# Patient Record
Sex: MALE | URBAN - METROPOLITAN AREA
[De-identification: names, ages, dates, MRNs, and addresses within clinical notes are randomized per-mention and may not be internally consistent; named-entity substitution may affect disease eponyms.]

---

## 2018-09-05 ENCOUNTER — OFFICE VISIT (OUTPATIENT)
Dept: GASTROENTEROLOGY | Facility: CLINIC | Age: 58
End: 2018-09-05
Payer: COMMERCIAL

## 2018-09-05 VITALS
WEIGHT: 215 LBS | BODY MASS INDEX: 24.88 KG/M2 | SYSTOLIC BLOOD PRESSURE: 128 MMHG | HEART RATE: 87 BPM | HEIGHT: 78 IN | DIASTOLIC BLOOD PRESSURE: 88 MMHG

## 2018-09-05 DIAGNOSIS — Z12.12 SCREENING FOR COLORECTAL CANCER: Primary | ICD-10-CM

## 2018-09-05 DIAGNOSIS — Z12.11 SCREENING FOR COLORECTAL CANCER: Primary | ICD-10-CM

## 2018-09-05 RX ORDER — AMINOCAPROIC ACID 1000 MG/1
TABLET ORAL
COMMUNITY

## 2018-09-05 NOTE — PATIENT INSTRUCTIONS
May schedule a screening colonoscopy following a split dose Suprep and either IV sedation or MAC per scheduling.

## 2018-09-07 NOTE — PROGRESS NOTES
HPI:    Patient ID: Venus Zuleta is a 66year old male. HPI  The patient is self-referred for colorectal cancer screening. He is the brother of Dr. Luke Fabry.   I recently performed his mother's colonoscopy in her upper [de-identified] who had in excess of 10 smal Abdominal: Soft. Bowel sounds are normal. He exhibits no distension and no mass. There is no hepatosplenomegaly. There is no tenderness. There is no rebound and no guarding. Musculoskeletal: He exhibits no edema.    Lymphadenopathy:     He has no cervic